# Patient Record
Sex: MALE | Race: WHITE | NOT HISPANIC OR LATINO | Employment: STUDENT | URBAN - NONMETROPOLITAN AREA
[De-identification: names, ages, dates, MRNs, and addresses within clinical notes are randomized per-mention and may not be internally consistent; named-entity substitution may affect disease eponyms.]

---
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
## 2023-06-09 ENCOUNTER — OFFICE VISIT (OUTPATIENT)
Dept: FAMILY MEDICINE CLINIC | Facility: CLINIC | Age: 18
End: 2023-06-09
Payer: MEDICARE

## 2023-06-09 VITALS
RESPIRATION RATE: 18 BRPM | HEART RATE: 91 BPM | BODY MASS INDEX: 23.79 KG/M2 | HEIGHT: 68 IN | SYSTOLIC BLOOD PRESSURE: 124 MMHG | OXYGEN SATURATION: 99 % | TEMPERATURE: 97.3 F | WEIGHT: 157 LBS | DIASTOLIC BLOOD PRESSURE: 84 MMHG

## 2023-06-09 DIAGNOSIS — F64.0 TRANSGENDER MAN ON HORMONE THERAPY: Primary | ICD-10-CM

## 2023-06-09 DIAGNOSIS — J45.20 MILD INTERMITTENT ASTHMA WITHOUT COMPLICATION: ICD-10-CM

## 2023-06-09 DIAGNOSIS — M25.50 ARTHRALGIA, UNSPECIFIED JOINT: ICD-10-CM

## 2023-06-09 DIAGNOSIS — Z72.0 TOBACCO USE: ICD-10-CM

## 2023-06-09 DIAGNOSIS — Z79.899 TRANSGENDER MAN ON HORMONE THERAPY: Primary | ICD-10-CM

## 2023-06-09 PROCEDURE — T1015 CLINIC SERVICE: HCPCS | Performed by: FAMILY MEDICINE

## 2023-06-09 RX ORDER — NAPROXEN 500 MG/1
500 TABLET ORAL 2 TIMES DAILY PRN
Qty: 30 TABLET | Refills: 0 | Status: SHIPPED | OUTPATIENT
Start: 2023-06-09

## 2023-06-09 RX ORDER — TESTOSTERONE ENANTHATE 100 MG/.5ML
INJECTION SUBCUTANEOUS
COMMUNITY
Start: 2023-05-10 | End: 2023-06-09 | Stop reason: SDUPTHER

## 2023-06-09 RX ORDER — ALBUTEROL SULFATE 90 UG/1
2 AEROSOL, METERED RESPIRATORY (INHALATION) EVERY 6 HOURS PRN
Qty: 8 G | Refills: 2 | Status: SHIPPED | OUTPATIENT
Start: 2023-06-09

## 2023-06-09 RX ORDER — FINASTERIDE 5 MG/1
5 TABLET, FILM COATED ORAL DAILY
Qty: 30 TABLET | Refills: 2 | Status: SHIPPED | OUTPATIENT
Start: 2023-06-09

## 2023-06-09 RX ORDER — TESTOSTERONE ENANTHATE 100 MG/.5ML
INJECTION SUBCUTANEOUS
Qty: 2 ML | Refills: 2 | Status: SHIPPED | OUTPATIENT
Start: 2023-06-09

## 2023-06-09 RX ORDER — ALBUTEROL SULFATE 90 UG/1
2 AEROSOL, METERED RESPIRATORY (INHALATION) EVERY 6 HOURS PRN
COMMUNITY
End: 2023-06-09 | Stop reason: SDUPTHER

## 2023-06-09 NOTE — PROGRESS NOTES
Assessment/Plan     Diagnoses and all orders for this visit:    Transgender man on hormone therapy  -     Xyosted 100 MG/0 5ML SOAJ; INJECT 1 AUTOINJECTOR SUBCUTANEOUSLY EVERY SEVEN DAYS  -     finasteride (PROSCAR) 5 mg tablet; Take 1 tablet (5 mg total) by mouth daily    Mild intermittent asthma without complication  -     albuterol (PROVENTIL HFA,VENTOLIN HFA) 90 mcg/act inhaler; Inhale 2 puffs every 6 (six) hours as needed for wheezing or shortness of breath    Tobacco use    Arthralgia, unspecified joint  -     naproxen (Naprosyn) 500 mg tablet; Take 1 tablet (500 mg total) by mouth 2 (two) times a day as needed for moderate pain    Other orders  -     Discontinue: Xyosted 100 MG/0 5ML SOAJ; INJECT 1 AUTOINJECTOR SUBCUTANEOUSLY EVERY SEVEN DAYS  -     Discontinue: albuterol (PROVENTIL HFA,VENTOLIN HFA) 90 mcg/act inhaler; Inhale 2 puffs every 6 (six) hours as needed      Plan:    Transgender man on hormonal therapy  -Reviewed recent labs today with patient  -Continue current Xyosted dose, refill provided  -Refilled patient's finasteride as well  -Recommend 6-month follow-up for gender affirming care    Mild intermittent asthma  -Stable at this time, no evidence of exacerbation  -Refilled albuterol to use as needed    Tobacco use  -Recommended tobacco cessation  -Will touch on this further at next appointment    Arthralgias  -Generalized, advised naproxen as needed    Will have patient check on immunization records and we will plan to update  Would recommend HPV vaccine and Tdap vaccine at this time  Would also benefit from pneumococcal vaccine given smoking history    Low risk for STIs but will offer screening testing with next lab work  Patient does need to consider cervical cancer screening    Patient in agreement with above plan  We will follow-up in 1 month    Subjective     Patient Id: Tenisha Angeles is a 21 y o  adult    HPI    Breana Blank is a 22 yo TG male presenting today to establish care     Sex assigned at birth: female  Gender Identity: male  Pronouns: He/him  Attracted to females  Sexually active with females  He does have a history of depression and anxiety  No current medications  He does see a counselor/therapist regularly  Reports his mental health is going well at this time  Currently takes Xyosted 100mg subq weekly and finasteride 5mg daily   Reports his last bloodwork was done a couple months ago  His last Xyosted injection was 2 days ago  He does need refills  He also has a history of asthma and uses albuterol prn  No recent flares and symptoms under control  Otherwise doing well today  Review of Systems   Constitutional: Negative for chills and fever  Respiratory: Negative for shortness of breath  Cardiovascular: Negative for chest pain  Gastrointestinal: Negative for abdominal pain, constipation, diarrhea, nausea and vomiting  Genitourinary: Negative for dysuria and menstrual problem  Musculoskeletal: Negative for arthralgias and myalgias  Skin: Negative for rash  Neurological: Negative for headaches  Psychiatric/Behavioral: Negative for dysphoric mood  All other systems reviewed and are negative  Past Medical History:   Diagnosis Date   • Anxiety    • Asthma    • Depression        Past Surgical History:   Procedure Laterality Date   • MASTECTOMY         Family History   Problem Relation Age of Onset   • Hypertension Father        Social History     Socioeconomic History   • Marital status: Single     Spouse name: None   • Number of children: None   • Years of education: None   • Highest education level: None   Occupational History   • None   Tobacco Use   • Smoking status: Never   • Smokeless tobacco: Never   Vaping Use   • Vaping Use: Every day   Substance and Sexual Activity   • Alcohol use:  Yes   • Drug use: Yes     Types: Marijuana   • Sexual activity: Yes     Partners: Female   Other Topics Concern   • None   Social History Narrative   • None     Social Determinants "of Health     Financial Resource Strain: Not on file   Food Insecurity: Not on file   Transportation Needs: Not on file   Physical Activity: Not on file   Stress: Not on file   Social Connections: Not on file   Intimate Partner Violence: Not on file   Housing Stability: Not on file       No Known Allergies      Current Outpatient Medications:   •  albuterol (PROVENTIL HFA,VENTOLIN HFA) 90 mcg/act inhaler, Inhale 2 puffs every 6 (six) hours as needed for wheezing or shortness of breath, Disp: 8 g, Rfl: 2  •  naproxen (Naprosyn) 500 mg tablet, Take 1 tablet (500 mg total) by mouth 2 (two) times a day as needed for moderate pain, Disp: 30 tablet, Rfl: 0  •  Xyosted 100 MG/0 5ML SOAJ, INJECT 1 AUTOINJECTOR SUBCUTANEOUSLY EVERY SEVEN DAYS, Disp: 2 mL, Rfl: 2  •  finasteride (PROSCAR) 5 mg tablet, Take 1 tablet (5 mg total) by mouth daily, Disp: 30 tablet, Rfl: 2    Objective     Vitals:    06/09/23 1046   BP: 124/84   Pulse: 91   Resp: 18   Temp: (!) 97 3 °F (36 3 °C)   SpO2: 99%   Weight: 71 2 kg (157 lb)   Height: 5' 8\" (1 727 m)       Physical Exam  Vitals reviewed  Constitutional:       General: He is not in acute distress  Appearance: Normal appearance  He is well-developed  He is not ill-appearing or toxic-appearing  HENT:      Head: Normocephalic and atraumatic  Eyes:      Extraocular Movements: Extraocular movements intact  Conjunctiva/sclera: Conjunctivae normal    Neck:      Thyroid: No thyromegaly  Cardiovascular:      Rate and Rhythm: Normal rate and regular rhythm  Heart sounds: Normal heart sounds  No murmur heard  No gallop  Pulmonary:      Effort: Pulmonary effort is normal  No respiratory distress  Breath sounds: Normal breath sounds  No wheezing, rhonchi or rales  Musculoskeletal:      Cervical back: Neck supple  Right lower leg: No edema  Left lower leg: No edema  Skin:     General: Skin is warm  Neurological:      General: No focal deficit present        " Mental Status: He is alert and oriented to person, place, and time     Psychiatric:         Mood and Affect: Mood normal          Behavior: Behavior normal          Florencia Rivas

## 2023-08-29 ENCOUNTER — TELEPHONE (OUTPATIENT)
Dept: FAMILY MEDICINE CLINIC | Facility: CLINIC | Age: 18
End: 2023-08-29

## 2023-08-29 ENCOUNTER — TELEPHONE (OUTPATIENT)
Dept: OBGYN CLINIC | Facility: CLINIC | Age: 18
End: 2023-08-29

## 2023-08-29 DIAGNOSIS — Z79.899 TRANSGENDER MAN ON HORMONE THERAPY: ICD-10-CM

## 2023-08-29 DIAGNOSIS — F64.0 TRANSGENDER MAN ON HORMONE THERAPY: ICD-10-CM

## 2023-08-29 RX ORDER — TESTOSTERONE ENANTHATE 100 MG/.5ML
INJECTION SUBCUTANEOUS
Qty: 2 ML | Refills: 2 | Status: CANCELLED | OUTPATIENT
Start: 2023-08-29

## 2023-08-29 RX ORDER — TESTOSTERONE ENANTHATE 100 MG/.5ML
INJECTION SUBCUTANEOUS
Qty: 2 ML | Refills: 2 | Status: SHIPPED | OUTPATIENT
Start: 2023-08-29

## 2023-08-29 NOTE — TELEPHONE ENCOUNTER
Pt called and said he would be late for his appt. Pt agreed he needs to be rescheduled so that he can have sufficient time for his appointment. Will try to call pt again to reschedule.

## 2023-08-30 ENCOUNTER — TELEPHONE (OUTPATIENT)
Dept: OBGYN CLINIC | Facility: CLINIC | Age: 18
End: 2023-08-30

## 2023-08-30 NOTE — TELEPHONE ENCOUNTER
Called patient and left voice mail for him per Dr. Sheila Reyna offered him a sooner appointment on 9/7 at 7/30 am or at 12 or 1230.

## 2023-10-08 NOTE — PROGRESS NOTES
Subjective:     Kan Campos is a 21 y.o. transgender male who presents to discuss transfer of care for gender affirming hormone therapy. 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 His care was previously managed by [facility]. He reports disjointed care due to communication difficulties. He initially went to a doctor near his home in [place] and he was therefore referred to me for further management. He reports that planning to move to [place] in November but will need refills prior to moving. He was started on testosterone 6 years ago and has been very happy with his changes and current dosing. He has been stable on Xyosted 100mg weekly. He switched to [facility] because he was having difficulties with the larger needles. He has been taking Finasteride to decrease hair loss. He has a history of anorexia and states that his hair line has not recovered. He has not noticed hair regrowth but has not noticed further loss. He had top surgery 3 years ago and is satisfied with his results. He has a history of anxiety and depression but not currently on medications. He sees a therapist weekly in [place]. He has asthma for which he has an inhaler. He is sexually active with females only. He is not currently on any contraception. He reports a history of a lapse in his testosterone prescription (1-1.5 months). During that him, he did have bleeding which was very difficult for his mental health. Now that he is back on testosterone, he denies any further bleeding. Patient Active Problem List   Diagnosis   • Transgender man on hormone therapy     Past Medical History:   Diagnosis Date   • Anxiety    • Asthma    • Depression        Objective:    Vitals: There were no vitals taken for this visit. There is no height or weight on file to calculate BMI. Physical Exam  Vitals reviewed. Constitutional:       General: He is not in acute distress.      Appearance: Normal appearance. He is well-developed. He is not ill-appearing, toxic-appearing or diaphoretic. Pulmonary:      Effort: Pulmonary effort is normal. No respiratory distress. Skin:     General: Skin is warm and dry. Neurological:      Mental Status: He is alert and oriented to person, place, and time. Psychiatric:         Mood and Affect: Mood normal.         Behavior: Behavior normal.       Assessment/Plan:    Problem List Items Addressed This Visit        Unprioritized    Transgender man on hormone therapy     Given that patient is planning for only temporary therapy, he has been stable on testosterone therapy x 6 years, and has previously been prescribed testosterone in-network, verbal consent was obtained for refills today (virtual appointment). Refills were provided for Xyosted 100mg weekly x 3 months and Finasteride 5mg daily x 3 months. He was encouraged to call the office with any questions or concerns and to establish with a gender affirming provider ASAP once moved to [place] to decrease possibility of lapse in hormone therapy.           Relevant Medications    Xyosted 100 MG/0.5ML SOAJ    finasteride (PROSCAR) 5 mg tablet       Narciso Dodson MD  10/10/2023  4:09 PM

## 2023-10-09 ENCOUNTER — TELEMEDICINE (OUTPATIENT)
Dept: OBGYN CLINIC | Facility: CLINIC | Age: 18
End: 2023-10-09

## 2023-10-09 DIAGNOSIS — F64.0 TRANSGENDER MAN ON HORMONE THERAPY: ICD-10-CM

## 2023-10-09 DIAGNOSIS — Z79.899 TRANSGENDER MAN ON HORMONE THERAPY: ICD-10-CM

## 2023-10-09 RX ORDER — FINASTERIDE 5 MG/1
5 TABLET, FILM COATED ORAL DAILY
Qty: 30 TABLET | Refills: 2 | Status: SHIPPED | OUTPATIENT
Start: 2023-10-09

## 2023-10-09 RX ORDER — TESTOSTERONE ENANTHATE 100 MG/.5ML
100 INJECTION SUBCUTANEOUS WEEKLY
Qty: 2 ML | Refills: 2 | Status: SHIPPED | OUTPATIENT
Start: 2023-10-09

## 2023-10-10 PROBLEM — F64.0 TRANSGENDER MAN ON HORMONE THERAPY: Status: ACTIVE | Noted: 2023-10-10

## 2023-10-10 PROBLEM — Z79.899 TRANSGENDER MAN ON HORMONE THERAPY: Status: ACTIVE | Noted: 2023-10-10

## 2023-10-10 NOTE — ASSESSMENT & PLAN NOTE
Given that patient is planning for only temporary therapy, he has been stable on testosterone therapy x 6 years, and has previously been prescribed testosterone in-network, verbal consent was obtained for refills today (virtual appointment). Refills were provided for Xyosted 100mg weekly x 3 months and Finasteride 5mg daily x 3 months. He was encouraged to call the office with any questions or concerns and to establish with a gender affirming provider ASAP once moved to [place] to decrease possibility of lapse in hormone therapy.

## 2023-10-21 NOTE — PROGRESS NOTES
Virtual Regular Visit    Verification of patient location:    Patient is located at work in the following state in which I hold an active license [place]      Assessment/Plan:    Problem List Items Addressed This Visit          Unprioritized    Transgender man on hormone therapy     Given that patient is planning for only temporary therapy, he has been stable on testosterone therapy x 6 years, and has previously been prescribed testosterone in-network, verbal consent was obtained for refills today (virtual appointment). Refills were provided for Xyosted 100mg weekly x 3 months and Finasteride 5mg daily x 3 months. He was encouraged to call the office with any questions or concerns and to establish with a gender affirming provider ASAP once moved to [place] to decrease possibility of lapse in hormone therapy. Relevant Medications    Xyosted 100 MG/0.5ML SOAJ    finasteride (PROSCAR) 5 mg tablet            Reason for visit is   Chief Complaint   Patient presents with    Virtual Regular Visit        Encounter provider Harry Diggs MD    Provider located at [address] [place] [address] [address] [place], [place] [id number] [address] [address]      Recent Visits  No visits were found meeting these conditions. Showing recent visits within past 7 days and meeting all other requirements  Future Appointments  No visits were found meeting these conditions. Showing future appointments within next 150 days and meeting all other requirements       The patient was identified by name and date of birth. Jayne Dior was informed that this is a telemedicine visit and that the visit is being conducted through the [employer]. He agrees to proceed. .  My office door was closed. No one else was in the room. He acknowledged consent and understanding of privacy and security of the video platform.  The patient has agreed to participate and understands they can discontinue the visit at any time.    Patient is aware this is a billable service. Giancarlo Stewart is a 21 y.o. transgender male who presents to discuss transfer of care for gender affirming hormone therapy. 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 His care was previously managed by [facility]. He reports disjointed care due to communication difficulties. He initially went to a doctor near his home in [place] and he was therefore referred to me for further management. He reports that planning to move to [place] in November but will need refills prior to moving. He was started on testosterone 6 years ago and has been very happy with his changes and current dosing. He has been stable on Xyosted 100mg weekly. [facility] because he was having difficulties with the larger needles. He has been taking Finasteride to decrease hair loss. He has a history of anorexia and states that his hair line has not recovered. He has not noticed hair regrowth but has not noticed further loss. He had top surgery 3 years ago and is satisfied with his results. He has a history of anxiety and depression but not currently on medications. He sees a therapist weekly in [place]. He has asthma for which he has an inhaler. He is sexually active with females only. He is not currently on any contraception. He reports a history of a lapse in his testosterone prescription (1-1.5 months). During that him, he did have bleeding which was very difficult for his mental health. Now that he is back on testosterone, he denies any further bleeding.             Past Medical History:   Diagnosis Date    Anxiety     Asthma     Depression        Past Surgical History:   Procedure Laterality Date    MASTECTOMY         Current Outpatient Medications   Medication Sig Dispense Refill    finasteride (PROSCAR) 5 mg tablet Take 1 tablet (5 mg total) by mouth daily 30 tablet 2    Xyosted 100 MG/0.5ML SOAJ 100 mg by Subcutaneous (multi inj) route once a week INJECT 1 AUTOINJECTOR SUBCUTANEOUSLY EVERY SEVEN DAYS 2 mL 2    albuterol (PROVENTIL HFA,VENTOLIN HFA) 90 mcg/act inhaler Inhale 2 puffs every 6 (six) hours as needed for wheezing or shortness of breath 8 g 2    naproxen (Naprosyn) 500 mg tablet Take 1 tablet (500 mg total) by mouth 2 (two) times a day as needed for moderate pain 30 tablet 0     No current facility-administered medications for this visit. No Known Allergies      Video Exam    There were no vitals filed for this visit. Physical Exam   Constitutional:       General: He is not in acute distress. Appearance: Normal appearance. He is well-developed. He is not ill-appearing, toxic-appearing or diaphoretic. Pulmonary:      Effort: Pulmonary effort is normal. No respiratory distress. Skin:     General: Skin is warm and dry. Neurological:      Mental Status: He is alert and oriented to person, place, and time.    Psychiatric:         Mood and Affect: Mood normal.         Behavior: Behavior normal.     Visit Time  Total Visit Duration: 26 min

## 2023-11-15 DIAGNOSIS — F64.0 TRANSGENDER MAN ON HORMONE THERAPY: ICD-10-CM

## 2023-11-15 DIAGNOSIS — Z79.899 TRANSGENDER MAN ON HORMONE THERAPY: ICD-10-CM

## 2023-11-16 RX ORDER — TESTOSTERONE ENANTHATE 100 MG/.5ML
100 INJECTION SUBCUTANEOUS WEEKLY
Qty: 2 ML | Refills: 2 | Status: SHIPPED | OUTPATIENT
Start: 2023-11-16

## 2024-02-22 ENCOUNTER — TELEPHONE (OUTPATIENT)
Dept: OBGYN CLINIC | Facility: CLINIC | Age: 19
End: 2024-02-22

## 2024-02-22 DIAGNOSIS — F64.0 TRANSGENDER MAN ON HORMONE THERAPY: ICD-10-CM

## 2024-02-22 DIAGNOSIS — Z79.899 TRANSGENDER MAN ON HORMONE THERAPY: ICD-10-CM

## 2024-02-22 RX ORDER — TESTOSTERONE ENANTHATE 100 MG/.5ML
100 INJECTION SUBCUTANEOUS WEEKLY
Qty: 2 ML | Refills: 1 | Status: SHIPPED | OUTPATIENT
Start: 2024-02-22 | End: 2024-02-27 | Stop reason: SDUPTHER

## 2024-02-22 NOTE — TELEPHONE ENCOUNTER
I reviewed [facility] ([employer]) providers available in [place], [place] and found MARGRET Bond who is an endocrinology nurse practitioner - phone # [phone]. I do not know this provider personally but I am also a [facility] member and would recommend continuing to follow with a [facility] provider. I hope this helps!

## 2024-02-22 NOTE — TELEPHONE ENCOUNTER
Patient called the RX Refill Line. Message is being forwarded to the office.     Patient is requesting if Dr Christiansen can help find providers in [place] to help continue medication and get routine care.    Please contact patient at [phone]

## 2024-02-22 NOTE — TELEPHONE ENCOUNTER
Unable to e-prescribe at this time. Xyosted called in to [facility] pharmacy in [place], [place] as requested by patient.     Wendy Marrero MD  OB/GYN  She/her  2/22/2024  2:17 PM

## 2024-02-23 ENCOUNTER — TELEPHONE (OUTPATIENT)
Dept: OBGYN CLINIC | Facility: CLINIC | Age: 19
End: 2024-02-23

## 2024-02-23 ENCOUNTER — TELEPHONE (OUTPATIENT)
Age: 19
End: 2024-02-23

## 2024-02-23 NOTE — TELEPHONE ENCOUNTER
Lmom to call back in regards to recommendations for providers in [place] from Dr. Christiansen. Documented in telephone encounter 2/22

## 2024-02-23 NOTE — TELEPHONE ENCOUNTER
Reason for call:   [x] Prior Auth  [] Other:     Caller:  [x] Patient  [] Pharmacy  Name:   Address:   Callback Number:     Medication:       Ordering Provider:   [] PCP/Provider -   [x] Speciality/Provider - ob/gyn

## 2024-02-23 NOTE — TELEPHONE ENCOUNTER
PA for Xyosted     Submitted via    []CMM-KEY   [x]Purple Blue Bo-Case ID # [id number]   []Faxed to plan   []Other website   []Phone call Case ID #     Office notes sent, clinical questions answered. Awaiting determination    Turnaround time for your insurance to make a decision on your Prior Authorization can take 7-21 business days.

## 2024-02-23 NOTE — TELEPHONE ENCOUNTER
PA for Xyosted Approved   Date(s) approved February 23, 2024 to February 22, 2025    Patient advised by [x] kenxus Message                      [x] Phone call       Pharmacy advised by [x]Fax                                     []Phone call    Approval letter scanned into Media Yes

## 2024-02-27 DIAGNOSIS — F64.0 TRANSGENDER MAN ON HORMONE THERAPY: ICD-10-CM

## 2024-02-27 DIAGNOSIS — Z79.899 TRANSGENDER MAN ON HORMONE THERAPY: ICD-10-CM

## 2024-02-27 RX ORDER — TESTOSTERONE ENANTHATE 100 MG/.5ML
100 INJECTION SUBCUTANEOUS WEEKLY
Qty: 2 ML | Refills: 1 | Status: SHIPPED | OUTPATIENT
Start: 2024-02-27

## 2024-04-11 ENCOUNTER — TELEPHONE (OUTPATIENT)
Dept: FAMILY MEDICINE CLINIC | Facility: CLINIC | Age: 19
End: 2024-04-11

## 2024-08-05 ENCOUNTER — TELEPHONE (OUTPATIENT)
Dept: FAMILY MEDICINE CLINIC | Facility: CLINIC | Age: 19
End: 2024-08-05